# Patient Record
Sex: FEMALE | Race: OTHER | Employment: FULL TIME | URBAN - METROPOLITAN AREA
[De-identification: names, ages, dates, MRNs, and addresses within clinical notes are randomized per-mention and may not be internally consistent; named-entity substitution may affect disease eponyms.]

---

## 2024-05-10 ENCOUNTER — OFFICE VISIT (OUTPATIENT)
Dept: OBGYN CLINIC | Facility: CLINIC | Age: 39
End: 2024-05-10
Payer: COMMERCIAL

## 2024-05-10 VITALS
HEIGHT: 67 IN | SYSTOLIC BLOOD PRESSURE: 148 MMHG | DIASTOLIC BLOOD PRESSURE: 102 MMHG | BODY MASS INDEX: 30.29 KG/M2 | WEIGHT: 193 LBS

## 2024-05-10 DIAGNOSIS — Z01.419 NORMAL GYNECOLOGIC EXAMINATION: Primary | ICD-10-CM

## 2024-05-10 DIAGNOSIS — Z12.31 BREAST CANCER SCREENING BY MAMMOGRAM: ICD-10-CM

## 2024-05-10 PROCEDURE — 3080F DIAST BP >= 90 MM HG: CPT | Performed by: OBSTETRICS & GYNECOLOGY

## 2024-05-10 PROCEDURE — 99385 PREV VISIT NEW AGE 18-39: CPT | Performed by: OBSTETRICS & GYNECOLOGY

## 2024-05-10 PROCEDURE — 3077F SYST BP >= 140 MM HG: CPT | Performed by: OBSTETRICS & GYNECOLOGY

## 2024-05-10 PROCEDURE — 3008F BODY MASS INDEX DOCD: CPT | Performed by: OBSTETRICS & GYNECOLOGY

## 2024-05-10 RX ORDER — DULOXETIN HYDROCHLORIDE 60 MG/1
CAPSULE, DELAYED RELEASE ORAL
COMMUNITY
Start: 2023-04-14

## 2024-05-10 RX ORDER — AMOXICILLIN 250 MG
1 CAPSULE ORAL
COMMUNITY

## 2024-05-10 RX ORDER — DEXTROAMPHETAMINE SACCHARATE, AMPHETAMINE ASPARTATE MONOHYDRATE, DEXTROAMPHETAMINE SULFATE AND AMPHETAMINE SULFATE 5; 5; 5; 5 MG/1; MG/1; MG/1; MG/1
20 CAPSULE, EXTENDED RELEASE ORAL DAILY
COMMUNITY
Start: 2024-03-22

## 2024-05-10 RX ORDER — DIAZEPAM 10 MG/1
10 TABLET ORAL NIGHTLY PRN
COMMUNITY
Start: 2024-04-15

## 2024-05-10 RX ORDER — DEXTROAMPHETAMINE SACCHARATE, AMPHETAMINE ASPARTATE MONOHYDRATE, DEXTROAMPHETAMINE SULFATE AND AMPHETAMINE SULFATE 5; 5; 5; 5 MG/1; MG/1; MG/1; MG/1
CAPSULE, EXTENDED RELEASE ORAL
COMMUNITY
Start: 2024-04-23

## 2024-05-10 RX ORDER — DEXTROAMPHETAMINE SACCHARATE, AMPHETAMINE ASPARTATE, DEXTROAMPHETAMINE SULFATE AND AMPHETAMINE SULFATE 5; 5; 5; 5 MG/1; MG/1; MG/1; MG/1
TABLET ORAL
COMMUNITY
Start: 2023-05-01

## 2024-05-10 RX ORDER — CYCLOBENZAPRINE HCL 5 MG
5 TABLET ORAL 3 TIMES DAILY PRN
COMMUNITY
Start: 2024-02-16

## 2024-05-10 RX ORDER — LOSARTAN POTASSIUM AND HYDROCHLOROTHIAZIDE 12.5; 5 MG/1; MG/1
1 TABLET ORAL DAILY
COMMUNITY
Start: 2023-04-21

## 2024-05-10 RX ORDER — MELOXICAM 15 MG/1
TABLET ORAL
COMMUNITY
Start: 2024-04-15

## 2024-05-10 RX ORDER — MELOXICAM 7.5 MG/1
7.5 TABLET ORAL DAILY
COMMUNITY

## 2024-05-10 RX ORDER — AZITHROMYCIN 250 MG/1
TABLET, FILM COATED ORAL
COMMUNITY
Start: 2023-12-28

## 2024-05-10 RX ORDER — DULOXETIN HYDROCHLORIDE 30 MG/1
1 CAPSULE, DELAYED RELEASE ORAL AS DIRECTED
COMMUNITY
Start: 2023-03-17

## 2024-05-10 RX ORDER — PREDNISONE 10 MG/1
TABLET ORAL
COMMUNITY
Start: 2023-12-28

## 2024-05-10 RX ORDER — PREGABALIN 150 MG/1
150 CAPSULE ORAL 2 TIMES DAILY
COMMUNITY
Start: 2024-02-20

## 2024-05-10 RX ORDER — QUETIAPINE FUMARATE 50 MG/1
TABLET, FILM COATED ORAL
COMMUNITY
Start: 2024-03-22

## 2024-05-10 RX ORDER — HYDROCODONE BITARTRATE AND ACETAMINOPHEN 5; 325 MG/1; MG/1
1 TABLET ORAL EVERY 6 HOURS PRN
COMMUNITY
Start: 2023-11-28

## 2024-05-10 RX ORDER — AMOXICILLIN AND CLAVULANATE POTASSIUM 875; 125 MG/1; MG/1
1 TABLET, FILM COATED ORAL 2 TIMES DAILY
COMMUNITY
Start: 2023-12-15

## 2024-05-10 RX ORDER — DEXTROAMPHETAMINE SACCHARATE, AMPHETAMINE ASPARTATE MONOHYDRATE, DEXTROAMPHETAMINE SULFATE AND AMPHETAMINE SULFATE 3.75; 3.75; 3.75; 3.75 MG/1; MG/1; MG/1; MG/1
15 CAPSULE, EXTENDED RELEASE ORAL EVERY MORNING
COMMUNITY
Start: 2024-01-31

## 2024-05-10 RX ORDER — OMEPRAZOLE 40 MG/1
40 CAPSULE, DELAYED RELEASE ORAL DAILY
COMMUNITY

## 2024-05-10 RX ORDER — BUTALBITAL, ACETAMINOPHEN AND CAFFEINE 50; 325; 40 MG/1; MG/1; MG/1
1 TABLET ORAL EVERY 4 HOURS PRN
COMMUNITY
Start: 2023-03-15

## 2024-05-10 NOTE — PROGRESS NOTES
Lexy Amos is a 38 year old female  Patient's last menstrual period was 2018.   Chief Complaint   Patient presents with    Annual     Pt had biopsy for cervical fluid buildup had grainage.   Hysterectomy 2018, supracervical.     OBSTETRICS HISTORY:     OB History    Para Term  AB Living   3 2 2   1 3   SAB IAB Ectopic Multiple Live Births   1     1 3      # Outcome Date GA Lbr Alexey/2nd Weight Sex Type Anes PTL Lv   3A Term 07/02/15   7 lb 4 oz (3.289 kg) F CS-Unspec   SCARLET   3B Term 07/02/15   8 lb 2 oz (3.685 kg) M CS-Unspec   SCARLET   2 SAB 2014           1 Term 09   9 lb 2 oz (4.139 kg) F CS-Unspec   SCARLET       GYNE HISTORY:     Hx Prior Abnormal Pap: No  Pap Date: 23  Pap Result Notes: per pt normal. cervical fluid bluidup   Period Cycle (Days): partial hysterectomy (5/10/2024  9:36 AM)  Use of Birth Control (if yes, specify type): Hysterectomy (5/10/2024  9:36 AM)  Hx Prior Abnormal Pap: No (5/10/2024  9:36 AM)  Pap Date: 23 (5/10/2024  9:36 AM)  Pap Result Notes: per pt normal. cervical fluid bluidup (5/10/2024  9:36 AM)         No data to display                  MEDICAL HISTORY:     Past Medical History:    Fibromyalgia    OTHER DISEASES    PIH/ Preeclampsia       SURGICAL HISTORY:     Past Surgical History:   Procedure Laterality Date          X 1     delivery only      Endometrial ablation      Hc supracervical abdominal hysterectomy         SOCIAL HISTORY:     Social History     Socioeconomic History    Marital status:    Tobacco Use    Smoking status: Former     Types: Cigarettes    Smokeless tobacco: Never    Tobacco comments:     1 pack per week   Substance and Sexual Activity    Alcohol use: No    Drug use: No        FAMILY HISTORY:     History reviewed. No pertinent family history.    MEDICATIONS:       Current Outpatient Medications:     amoxicillin clavulanate 875-125 MG Oral Tab, Take 1 tablet by mouth 2 (two) times daily., Disp: ,  Rfl:     amphetamine-dextroamphetamine (ADDERALL) 20 MG Oral Tab, , Disp: , Rfl:     Amphetamine-Dextroamphet ER 15 MG Oral Capsule SR 24 Hr, Take 1 capsule (15 mg total) by mouth every morning., Disp: , Rfl:     Amphetamine-Dextroamphet ER 20 MG Oral Capsule SR 24 Hr, Take 1 capsule (20 mg total) by mouth daily., Disp: , Rfl:     Amphetamine-Dextroamphet ER 20 MG Oral Capsule SR 24 Hr, , Disp: , Rfl:     azithromycin 250 MG Oral Tab, TAKE 2 TABLETS BY MOUTH TODAY, THEN TAKE 1 TABLET DAILY FOR 4 DAYS AS DIRECTED, Disp: , Rfl:     butalbital-acetaminophen-caffeine -40 MG Oral Tab, Take 1 tablet by mouth every 4 (four) hours as needed., Disp: , Rfl:     cyclobenzaprine 5 MG Oral Tab, Take 1 tablet (5 mg total) by mouth 3 (three) times daily as needed for Muscle spasms., Disp: , Rfl:     diazePAM 10 MG Oral Tab, Take 1 tablet (10 mg total) by mouth nightly as needed for Sleep. AT BEDTIME, Disp: , Rfl:     Diclofenac Sodium  MG Oral Tablet 24 Hr, , Disp: , Rfl:     DULoxetine 60 MG Oral Cap DR Particles, , Disp: , Rfl:     HYDROcodone-acetaminophen 5-325 MG Oral Tab, Take 1 tablet by mouth every 6 (six) hours as needed for Pain., Disp: , Rfl:     losartan-hydroCHLOROthiazide 50-12.5 MG Oral Tab, Take 1 tablet by mouth daily., Disp: , Rfl:     DULoxetine 30 MG Oral Cap DR Particles, Take 1 capsule (30 mg total) by mouth As Directed., Disp: , Rfl:     Meloxicam 15 MG Oral Tab, TAKE ONE TABLET BY MOUTH ONCE DAILY FOR FIBROMYALGIA, Disp: , Rfl:     Meloxicam 7.5 MG Oral Tab, Take 1 tablet (7.5 mg total) by mouth daily., Disp: , Rfl:     Omeprazole 40 MG Oral Capsule Delayed Release, Take 1 capsule (40 mg total) by mouth daily., Disp: , Rfl:     predniSONE 10 MG Oral Tab, every 28 days. FOR 21 DAYS IN, THEN REMOVE FOR 7 DAYS, Disp: , Rfl:     pregabalin 150 MG Oral Cap, Take 1 capsule (150 mg total) by mouth 2 (two) times daily., Disp: , Rfl:     SEROQUEL 50 MG Oral Tab, , Disp: , Rfl:     sennosides-docusate  8.6-50 MG Oral Tab, Take 1 tablet by mouth twice a week., Disp: , Rfl:     Butalbital-Aspirin-Caffeine (BUTALBITAL COMPOUND/ASA) -40 MG Oral Cap, 1 po q 6 hr prn, max 2 per day and 5 week, Disp: 20 Cap, Rfl: 2    ALLERGIES:       Allergies   Allergen Reactions    Cortisone OTHER (SEE COMMENTS) and PAIN    Amitriptyline FATIGUE    Latex ITCHING    Sulfa Drugs Cross Reactors RASH     Burning sensation on skin         REVIEW OF SYSTEMS:     Constitutional:    denies fever / chills  Eyes:     denies blurred or double vision  Cardiovascular:  denies chest pain or palpitations  Respiratory:    denies shortness of breath  Gastrointestinal:  denies severe abdominal pain, frequent diarrhea or constipation, nausea / vomiting  Genitourinary:    denies dysuria, bothersome incontinence  Skin/Breast:   denies any breast pain, lumps, or discharge  Neurological:    denies frequent severe headaches  Psychiatric:   denies depression or anxiety, thoughts of harming self or others  Heme/Lymph:    denies easy bruising or bleeding      PHYSICAL EXAM:   Blood pressure (!) 148/102, height 5' 7\" (1.702 m), weight 193 lb (87.5 kg), last menstrual period 12/12/2018, not currently breastfeeding.  Constitutional:  well developed, well nourished  Head/Face:  normocephalic  Neck/Thyroid: thyroid symmetric, no thyromegaly, no nodules, no adenopathy  Lymphatic: no abnormal supraclavicular or axillary adenopathy is noted  Respiratory:      chest wall symmetric and nontender on palpation, clear to asculation bilateral, no wheezing, rales, ronchi, and resonance normal upon percussion  Cardiovascular: chest normal in appearance, regular rate and rhythm, no murmurs, PMI palpated midclavicular line  Breast:   normal without palpable masses, tenderness, asymmetry, nipple discharge, nipple retraction or skin changes  Abdomen:   soft, nontender, nondistended, no masses  Skin/Hair:  no unusual rashes or bruises  Extremities:  no edema, no cyanosis, non  tender bilaterally  Psychiatric:   oriented to time, place, person and situation. Appropriate mood and affect    Pelvic Exam:  External Genitalia:  normal appearance, hair distribution, and no lesions  Urethral Meatus:   normal in size, location, without lesions   Bladder:    no fullness, masses or tenderness  Vagina:    normal appearance without lesions, no abnormal discharge  Cervix:    No lesions, normal friability   Adnexa:   normal without masses or tenderness  Perineum:   normal  Anus: no hemorroids         ASSESSMENT & PLAN:     Lexy was seen today for annual.    Diagnoses and all orders for this visit:    Normal gynecologic examination  -     ThinPrep Pap with HPV Reflex, Chlamydia/GC; Future  -     Chlamydia/Gc Amplification; Future  Nutrition, weight screening and exercise were discussed with the patient.  Exercise should encompass approximately 150 minutes/week.  Self breast exam counseling was also given.  I advised the patient to avoid tobacco, drugs and alcohol.  Influenza vaccine was offered if seasonally appropriate.  HPV and STD prevention counseling was given.  Health maintenance checklist  was reviewed including Pap smear, cervical cultures, and mammogram screening if age-appropriate.  Appropriate follow-up scheduling was discussed with the patient.     Breast cancer screening by mammogram  -     [facility] BERTIN 2D+3D SCREENING BILAT (CPT=77067/20876); Future    Hx endometriosis- c/o painful coitus.       FOLLOW-UP     Return in about 1 year (around 5/10/2025) for Annual exam.      Carlton Ludwig MD  5/10/2024

## 2024-05-13 LAB
C TRACH DNA SPEC QL NAA+PROBE: NEGATIVE
N GONORRHOEA DNA SPEC QL NAA+PROBE: NEGATIVE

## 2024-06-21 NOTE — PROGRESS NOTES
Lexy Amos is a 39 year old female  Patient's last menstrual period was 2018.   Chief Complaint   Patient presents with    Gyn Problem     Left breast pain    Left armpit, and luouter quad. No c/o right breast.     OBSTETRICS HISTORY:     OB History    Para Term  AB Living   3 2 2   1 3   SAB IAB Ectopic Multiple Live Births   1     1 3      # Outcome Date GA Lbr Alexey/2nd Weight Sex Type Anes PTL Lv   3A Term 07/02/15   7 lb 4 oz (3.289 kg) F CS-Unspec   SCARLET   3B Term 07/02/15   8 lb 2 oz (3.685 kg) M CS-Unspec   SCARLET   2 SAB 2014           1 Term 09   9 lb 2 oz (4.139 kg) F CS-Unspec   SCARLET       GYNE HISTORY:         Period Cycle (Days): partial hysterectomy (5/10/2024  9:36 AM)  Use of Birth Control (if yes, specify type): Hysterectomy (5/10/2024  9:36 AM)  Hx Prior Abnormal Pap: No (5/10/2024  9:36 AM)  Pap Date: 23 (5/10/2024  9:36 AM)  Pap Result Notes: per pt normal. cervical fluid bluidup (5/10/2024  9:36 AM)         No data to display                  MEDICAL HISTORY:     Past Medical History:    Fibromyalgia    OTHER DISEASES    PIH/ Preeclampsia       SURGICAL HISTORY:     Past Surgical History:   Procedure Laterality Date          X 1     delivery only      Endometrial ablation      Hc supracervical abdominal hysterectomy      Hysterectomy  2018       SOCIAL HISTORY:     Social History     Socioeconomic History    Marital status:    Tobacco Use    Smoking status: Former     Types: Cigarettes    Smokeless tobacco: Never    Tobacco comments:     1 pack per week   Substance and Sexual Activity    Alcohol use: No    Drug use: No        FAMILY HISTORY:     History reviewed. No pertinent family history.    MEDICATIONS:       Current Outpatient Medications:     Amphetamine-Dextroamphet ER 20 MG Oral Capsule SR 24 Hr, Take 1 capsule (20 mg total) by mouth daily., Disp: , Rfl:     cyclobenzaprine 5 MG Oral Tab, Take 1 tablet (5 mg total) by mouth 3  (three) times daily as needed for Muscle spasms., Disp: , Rfl:     diazePAM 10 MG Oral Tab, Take 1 tablet (10 mg total) by mouth nightly as needed for Sleep. AT BEDTIME, Disp: , Rfl:     Diclofenac Sodium  MG Oral Tablet 24 Hr, , Disp: , Rfl:     DULoxetine 60 MG Oral Cap DR Particles, , Disp: , Rfl:     HYDROcodone-acetaminophen 5-325 MG Oral Tab, Take 1 tablet by mouth every 6 (six) hours as needed for Pain., Disp: , Rfl:     losartan-hydroCHLOROthiazide 50-12.5 MG Oral Tab, Take 1 tablet by mouth daily., Disp: , Rfl:     DULoxetine 30 MG Oral Cap DR Particles, Take 1 capsule (30 mg total) by mouth As Directed., Disp: , Rfl:     Omeprazole 40 MG Oral Capsule Delayed Release, Take 1 capsule (40 mg total) by mouth daily., Disp: , Rfl:     pregabalin 150 MG Oral Cap, Take 1 capsule (150 mg total) by mouth 2 (two) times daily., Disp: , Rfl:     SEROQUEL 50 MG Oral Tab, , Disp: , Rfl:     sennosides-docusate 8.6-50 MG Oral Tab, Take 1 tablet by mouth twice a week., Disp: , Rfl:     Butalbital-Aspirin-Caffeine (BUTALBITAL COMPOUND/ASA) -40 MG Oral Cap, 1 po q 6 hr prn, max 2 per day and 5 week, Disp: 20 Cap, Rfl: 2    amphetamine-dextroamphetamine (ADDERALL) 20 MG Oral Tab, , Disp: , Rfl:     Amphetamine-Dextroamphet ER 15 MG Oral Capsule SR 24 Hr, Take 1 capsule (15 mg total) by mouth every morning. (Patient not taking: Reported on 6/21/2024), Disp: , Rfl:     Amphetamine-Dextroamphet ER 20 MG Oral Capsule SR 24 Hr, , Disp: , Rfl:     butalbital-acetaminophen-caffeine -40 MG Oral Tab, Take 1 tablet by mouth every 4 (four) hours as needed. (Patient not taking: Reported on 6/21/2024), Disp: , Rfl:     Meloxicam 15 MG Oral Tab, TAKE ONE TABLET BY MOUTH ONCE DAILY FOR FIBROMYALGIA (Patient not taking: Reported on 6/21/2024), Disp: , Rfl:     Meloxicam 7.5 MG Oral Tab, Take 1 tablet (7.5 mg total) by mouth daily., Disp: , Rfl:     predniSONE 10 MG Oral Tab, every 28 days. FOR 21 DAYS IN, THEN REMOVE FOR 7  DAYS (Patient not taking: Reported on 6/21/2024), Disp: , Rfl:     ALLERGIES:       Allergies   Allergen Reactions    Cortisone OTHER (SEE COMMENTS) and PAIN    Amitriptyline FATIGUE    Latex ITCHING    Sulfa Drugs Cross Reactors RASH     Burning sensation on skin         REVIEW OF SYSTEMS:     Constitutional:    denies fever / chills  Eyes:     denies blurred or double vision  Cardiovascular:  denies chest pain or palpitations  Respiratory:    denies shortness of breath  Gastrointestinal:  denies severe abdominal pain, frequent diarrhea or constipation, nausea / vomiting  Genitourinary:    denies dysuria, bothersome incontinence  Skin/Breast:   denies any breast pain, lumps, or discharge  Neurological:    denies frequent severe headaches  Psychiatric:   denies depression or anxiety, thoughts of harming self or others  Heme/Lymph:    denies easy bruising or bleeding      PHYSICAL EXAM:   Blood pressure 124/84, pulse 87, height 5' 7\" (1.702 m), weight 196 lb (88.9 kg), last menstrual period 12/12/2018, not currently breastfeeding.  Constitutional:  well developed, well nourished  Head/Face:  normocephalic  Neck/Thyroid: thyroid symmetric, no thyromegaly, no nodules, no adenopathy  Lymphatic: no abnormal supraclavicular or axillary adenopathy is noted  Respiratory:      chest wall symmetric and nontender on palpation, clear to asculation bilateral, no wheezing, rales, ronchi, and resonance normal upon percussion  Cardiovascular: chest normal in appearance, regular rate and rhythm, no murmurs, PMI palpated midclavicular line  Breast:   normal without palpable masses, tenderness, asymmetry, nipple discharge, nipple retraction or skin changes bilaterally  Abdomen:   soft, nontender, nondistended, no masses  Skin/Hair:  no unusual rashes or bruises  Extremities:  no edema, no cyanosis, non tender bilaterally  Psychiatric:   oriented to time, place, person and situation. Appropriate mood and affect          ASSESSMENT &  PLAN:     Lexy was seen today for gyn problem.    Diagnoses and all orders for this visit:    Breast pain, left  -     SURGERY - INTERNAL  Exam normal today. Imaging reviewed. F/u noted 6 mos.   Pt does not consume caffeine  She has fibromyalgia  Plan: refer to general surgery to evaluate films        FOLLOW-UP     No follow-ups on file.      Carlton Ludwig MD  6/21/2024

## 2024-11-04 NOTE — PROGRESS NOTES
Lexy Amos is a 39 year old female  Patient's last menstrual period was 10/30/2024.   Chief Complaint   Patient presents with    Gyn Exam     Pt c/o abdominal pain and cramping post hysterectomy. Horrible cramps since last week no bleeding   Surgery 2023 Dr. Escobar. Pt c/o last week getting out of vehicle she had a pain from \"cervix to my back\" lasting 45 secs. Ever since then cramping.     OBSTETRICS HISTORY:     OB History    Para Term  AB Living   4 2 2   2 3   SAB IAB Ectopic Multiple Live Births   2     1 3      # Outcome Date GA Lbr Alexey/2nd Weight Sex Type Anes PTL Lv   4A Term 07/02/15   7 lb 4 oz (3.289 kg) F CS-Unspec   SCARLET   4B Term 07/02/15   8 lb 2 oz (3.685 kg) M CS-Unspec   SCARLET   3 SAB 2014           2 Term 09   9 lb 2 oz (4.139 kg) F CS-Unspec   SCARLET   1 SAB                GYNE HISTORY:         Period Cycle (Days): partial hysterectomy (5/10/2024  9:36 AM)  Use of Birth Control (if yes, specify type): Hysterectomy (5/10/2024  9:36 AM)  Hx Prior Abnormal Pap: No (5/10/2024  9:36 AM)  Pap Date: 23 (5/10/2024  9:36 AM)  Pap Result Notes: per pt normal. cervical fluid bluidup (5/10/2024  9:36 AM)         No data to display                  MEDICAL HISTORY:     Past Medical History:    Fibromyalgia    OTHER DISEASES    PIH/ Preeclampsia       SURGICAL HISTORY:     Past Surgical History:   Procedure Laterality Date          X 1     delivery only      Endometrial ablation      Hc supracervical abdominal hysterectomy      Hysterectomy  2018       SOCIAL HISTORY:     Social History     Socioeconomic History    Marital status:    Tobacco Use    Smoking status: Former     Types: Cigarettes     Passive exposure: Past    Smokeless tobacco: Never    Tobacco comments:     1 pack per week   Substance and Sexual Activity    Alcohol use: No    Drug use: No        FAMILY HISTORY:     History reviewed. No pertinent family history.    MEDICATIONS:       Current  Outpatient Medications:     zolpidem 10 MG Oral Tab, Take 1 tablet (10 mg total) by mouth nightly as needed., Disp: , Rfl:     teraconazole 0.4 % Vaginal Cream, Place 1 applicator vaginally nightly for 7 days., Disp: 7 each, Rfl: 0    amphetamine-dextroamphetamine (ADDERALL) 20 MG Oral Tab, , Disp: , Rfl:     Amphetamine-Dextroamphet ER 20 MG Oral Capsule SR 24 Hr, Take 1 capsule (20 mg total) by mouth daily., Disp: , Rfl:     butalbital-acetaminophen-caffeine -40 MG Oral Tab, Take 1 tablet by mouth every 4 (four) hours as needed., Disp: , Rfl:     cyclobenzaprine 5 MG Oral Tab, Take 1 tablet (5 mg total) by mouth 3 (three) times daily as needed for Muscle spasms., Disp: , Rfl:     diazePAM 10 MG Oral Tab, Take 1 tablet (10 mg total) by mouth nightly as needed for Sleep. AT BEDTIME, Disp: , Rfl:     DULoxetine 60 MG Oral Cap DR Particles, , Disp: , Rfl:     HYDROcodone-acetaminophen 5-325 MG Oral Tab, Take 1 tablet by mouth every 6 (six) hours as needed for Pain., Disp: , Rfl:     losartan-hydroCHLOROthiazide 50-12.5 MG Oral Tab, Take 1 tablet by mouth daily., Disp: , Rfl:     DULoxetine 30 MG Oral Cap DR Particles, Take 1 capsule (30 mg total) by mouth As Directed., Disp: , Rfl:     Meloxicam 15 MG Oral Tab, , Disp: , Rfl:     Omeprazole 40 MG Oral Capsule Delayed Release, Take 1 capsule (40 mg total) by mouth daily., Disp: , Rfl:     pregabalin 150 MG Oral Cap, Take 1 capsule (150 mg total) by mouth 2 (two) times daily., Disp: , Rfl:     SEROQUEL 50 MG Oral Tab, , Disp: , Rfl:     sennosides-docusate 8.6-50 MG Oral Tab, Take 1 tablet by mouth twice a week., Disp: , Rfl:     Diclofenac Sodium  MG Oral Tablet 24 Hr, , Disp: , Rfl:     ALLERGIES:     Allergies[1]      REVIEW OF SYSTEMS:     Constitutional:    denies fever / chills  Eyes:     denies blurred or double vision  Cardiovascular:  denies chest pain or palpitations  Respiratory:    denies shortness of breath  Gastrointestinal:  denies severe  abdominal pain, frequent diarrhea or constipation, nausea / vomiting  Genitourinary:    denies dysuria, bothersome incontinence  Skin/Breast:   denies any breast pain, lumps, or discharge  Neurological:    denies frequent severe headaches  Psychiatric:   denies depression or anxiety, thoughts of harming self or others  Heme/Lymph:    denies easy bruising or bleeding      PHYSICAL EXAM:   Blood pressure (!) 156/104, pulse 89, height 5' 7\" (1.702 m), weight 207 lb (93.9 kg), last menstrual period 10/30/2024, not currently breastfeeding.  Constitutional:  well developed, well nourished  Head/Face:  normocephalic  Neck/Thyroid: thyroid symmetric, no thyromegaly, no nodules, no adenopathy  Lymphatic: no abnormal supraclavicular or axillary adenopathy is noted  Respiratory:      chest wall symmetric and nontender on palpation, clear to asculation bilateral, no wheezing, rales, ronchi, and resonance normal upon percussion  Cardiovascular: chest normal in appearance, regular rate and rhythm, no murmurs, PMI palpated midclavicular line  Abdomen:   soft, nontender, nondistended, no masses  Skin/Hair:  no unusual rashes or bruises  Extremities:  no edema, no cyanosis, non tender bilaterally  Psychiatric:   oriented to time, place, person and situation. Appropriate mood and affect    Pelvic Exam:  External Genitalia:  normal appearance, hair distribution, and no lesions  Urethral Meatus:   normal in size, location, without lesions   Bladder:    no fullness, masses or tenderness  Vagina:    normal appearance without lesions, moderate thick white adherent discharge  Cervix:    No lesions, normal friability ,  nontender with qtip  Sve: no pelvic mass/tenderness      ASSESSMENT & PLAN:     Lexy was seen today for gyn exam.    Diagnoses and all orders for this visit:    Pelvic pain  -     US PELVIS (TRANSABDOMINAL AND TRANSVAGINAL) (CPT=76856/54933); Future  The cramping is most likely not GYN related.  I recommend an ultrasound  of the pelvis which I have ordered.  If it does not show fluid retention of the cervix then I recommend the patient follow-up with her primary care doctor to rule out GI or musculoskeletal origin.  The patient was told that if she has persistent pelvic pain that she can follow-up with Dr. Garcia or other gynecology oncology at [place] to see if she is a candidate for a laparoscopic bilateral oophorectomy as the patient states she has a history of endometriosis and previous surgery and it is possible that endometriosis could be because of persistent future pelvic pain.  Discussed with the patient in detail.  Vaginal candidiasis  -     teraconazole 0.4 % Vaginal Cream; Place 1 applicator vaginally nightly for 7 days.  Terazol 7 E scribed         FOLLOW-UP     No follow-ups on file.      Carlton Ludwig MD  11/4/2024         [1]   Allergies  Allergen Reactions    Cortisone OTHER (SEE COMMENTS) and PAIN    Amitriptyline FATIGUE    Latex ITCHING    Sulfa Drugs Cross Reactors RASH     Burning sensation on skin

## 2024-12-04 NOTE — TELEPHONE ENCOUNTER
Patient states a cervical ultrasound was ordered by Dr. Ludwig, states she does not want to have it done and will like order canceled . Please advise

## 2024-12-04 NOTE — TELEPHONE ENCOUNTER
Pt verified name and .    Pt requesting that order for pelvic ultrasound be cancelled as she would not like to have pelvic ultrasound completed. Order cancelled per pt request.

## 2025-05-23 NOTE — PROGRESS NOTES
Lexy Amos is a 40 year old female  Patient's last menstrual period was 10/30/2024.   Chief Complaint   Patient presents with    Annual     Pt here for annual exam no complaints     Pt had supracervical hysterectomy.   OBSTETRICS HISTORY:     OB History    Para Term  AB Living   4 2 2  2 3   SAB IAB Ectopic Multiple Live Births   2   1 3      # Outcome Date GA Lbr Alexey/2nd Weight Sex Type Anes PTL Lv   4A Term 07/02/15   7 lb 4 oz (3.289 kg) F CS-Unspec   SCARLET   4B Term 07/02/15   8 lb 2 oz (3.685 kg) M CS-Unspec   SCARLET   3 SAB 2014           2 Term 09   9 lb 2 oz (4.139 kg) F CS-Unspec   SCARLET   1 SAB                GYNE HISTORY:     Hx Prior Abnormal Pap: No  Pap Date: 05/10/24  Pap Result Notes: wnl   Use of Birth Control (if yes, specify type): Hysterectomy (2025 11:42 AM)  Hx Prior Abnormal Pap: No (2025 11:43 AM)  Pap Date: 05/10/24 (2025 11:43 AM)  Pap Result Notes: wnl (2025 11:43 AM)         No data to display                  MEDICAL HISTORY:     Past Medical History[1]    SURGICAL HISTORY:     Past Surgical History[2]    SOCIAL HISTORY:     Short Social Hx on File[3]     FAMILY HISTORY:     Family History[4]    MEDICATIONS:     Medications - Current[5]    ALLERGIES:     Allergies[6]      REVIEW OF SYSTEMS:     Constitutional:    denies fever / chills  Eyes:     denies blurred or double vision  Cardiovascular:  denies chest pain or palpitations  Respiratory:    denies shortness of breath  Gastrointestinal:  denies severe abdominal pain, frequent diarrhea or constipation, nausea / vomiting  Genitourinary:    denies dysuria, bothersome incontinence  Skin/Breast:   denies any breast pain, lumps, or discharge  Neurological:    denies frequent severe headaches  Psychiatric:   denies depression or anxiety, thoughts of harming self or others  Heme/Lymph:    denies easy bruising or bleeding      PHYSICAL EXAM:   Blood pressure 123/86, height 5' 7\" (1.702 m), weight 227  lb (103 kg), last menstrual period 10/30/2024, not currently breastfeeding.  Constitutional:  well developed, well nourished  Head/Face:  normocephalic  Neck/Thyroid: thyroid symmetric, no thyromegaly, no nodules, no adenopathy  Lymphatic: no abnormal supraclavicular or axillary adenopathy is noted  Respiratory:      chest wall symmetric and nontender on palpation, clear to asculation bilateral, no wheezing, rales, ronchi, and resonance normal upon percussion  Cardiovascular: chest normal in appearance, regular rate and rhythm, no murmurs, PMI palpated midclavicular line  Breast:   normal bilaterally without palpable masses, tenderness, asymmetry, nipple discharge, nipple retraction or skin changes bilaterally  Abdomen:   soft, nontender, nondistended, no masses  Skin/Hair:  no unusual rashes or bruises  Extremities:  no edema, no cyanosis, non tender bilaterally  Psychiatric:   oriented to time, place, person and situation. Appropriate mood and affect    Pelvic Exam:  External Genitalia:  normal appearance, hair distribution, and no lesions  Urethral Meatus:   normal in size, location, without lesions   Bladder:    no fullness, masses or tenderness  Vagina:    normal appearance without lesions, no abnormal discharge  Cervix:    No lesions, normal friability   Adnexa:   normal without masses or tenderness  Perineum:   normal  Anus: no hemorroids         ASSESSMENT & PLAN:     Leyx was seen today for annual.    Diagnoses and all orders for this visit:    Normal gynecologic examination  -     ThinPrep Pap with HPV Reflex, Chlamydia/GC; Future  -     Chlamydia/Gc Amplification; Future  Nutrition, weight screening and exercise were discussed with the patient.  Exercise should encompass approximately 150 minutes/week.  Self breast exam counseling was also given.  I advised the patient to avoid tobacco, drugs and alcohol.  Influenza vaccine was offered if seasonally appropriate.  HPV and STD prevention counseling was  given.  Health maintenance checklist  was reviewed including Pap smear, cervical cultures, and mammogram screening if age-appropriate.  Appropriate follow-up scheduling was discussed with the patient.    Pt has f/u us breast scheduled  Encounter for tobacco use cessation counseling  -     Smoking Cessation less than 3 minutes  TOBACCO CESSATIONS    Risks of tobacco  Knowing the serious health risks of using tobacco may help motivate you to quit. Using tobacco over a long time can increase your risk for many health problems.    Information  Tobacco is a plant. Its leaves are smoked, chewed, or sniffed for a variety of effects.  Tobacco contains the chemical nicotine, which is an addictive substance.  Tobacco smoke contains more than 7,000 chemicals, at least 70 of which are known to cause cancer.  Tobacco that is not burned is called smokeless tobacco. Including nicotine, there are at least 30 chemicals in smokeless tobacco that are known to cause cancer.    HEALTH RISKS OF SMOKING OR USING SMOKELESS TOBACCO  There are many health risks from smoking and using tobacco. The more serious ones are listed below.    Heart and blood vessel problems:  Blood clots and weakness in the walls of blood vessels in the brain, which can lead to stroke  Blood clots in the legs, which may travel to the lungs  Coronary artery disease, including angina and heart attack  Temporarily increased blood pressure after smoking  Poor blood supply to the legs  Problems with erections because of decreased blood flow into the penis    Other health risks or problems:  Cancer (more likely in the lung, mouth, larynx, nose and sinuses, throat, esophagus, stomach, bladder, kidney, pancreas, cervix, colon, and rectum)  Poor wound healing after surgery  Lung problems, such as COPD, or asthma that is harder to control  Problems during pregnancy, such as babies born at a low birth weight, early labor, losingour baby , and cleft lip  Decreased ability to  taste and smell  Harm to sperm, which may lead to infertility  Loss of sight due to an increased risk of macular degeneration  Tooth and gum diseases  Wrinkling of the skin    Smokers who switch to smokeless tobacco instead of quitting tobacco still have health risks:  Increased risk for cancer of the mouth, tongue, esophagus, and pancreas  Gum problems, tooth wear, and cavities  Worsening high blood pressure and angina    HEALTH RISKS OF SECONDHAND SMOKE   Those who are often around the smoke of others (secondhand smoke) have a higher risk for:  Heart attack and heart disease   Lung cancer  Sudden and severe reactions, including of the eye, nose, throat, and lower respiratory tract    Infants and children who are often exposed to secondhand smoke are at risk for:  Asthma flares (children with asthma who live with a smoker are much more likely to visit the emergency room)  Infections of the mouth, throat, sinuses, ears, and lungs  Lung damage (poor lung function)  Sudden infant death syndrome (SIDS)    There are many reasons to quit using tobacco. Long-term use of tobacco can increase your risk of many serious health problems.    THE BENEFITS OF QUITTING    HEALTH BENEFITS   Some health benefits begin almost immediately. Every week, month, and year without tobacco further improves your health.  Within 20 minutes of quitting: Your blood pressure and heart rate drop to normal.  Within 12 hours of quitting: Your blood carbon monoxide level drops to normal.  Within 2 weeks to 3 months of quitting: Your circulation improves and your lung function increases.  Within 1 to 9 months of quitting: Coughing and shortness of breath decrease. Your lungs and airways are more able to handle mucus, clean the lungs, and reduce the risk of infection.  Within 1 year of quitting: Your risk of coronary heart disease is half that of someone still using tobacco. Your heart attack risk drops dramatically.  Within 5 years of quitting: Your  risk of mouth, throat, esophagus, and bladder cancers are reduced by half. Cervical cancer risk falls to that of a non-smoker. Your stroke risk can fall to that of a non-smoker after 2 to 5 years.   Within 10 years of quitting: Your risk of dying from lung cancer is about one half that of a person who still smokes.  Within 15 years of quitting: Your risk of coronary heart disease is that of a non-smoker's.    Other health benefits of quitting smoking include:  Lower chance of blood clots in the legs, which may travel to the lungs   Lower risk of erectile dysfunction  Fewer problems during pregnancy, such as babies born at low birth weight, premature labor, miscarriage, and cleft lip  Lower risk of infertility due to damaged sperm  Healthier teeth, gums, and skin    Infants and children who you live with will have:  Asthma that is easier to control  Fewer visits to the emergency room  Fewer colds, ear infections, and pneumonia  Reduced risk of sudden infant death syndrome (SIDS)    MAKING THE DECISION  If you smoke, you should quit. But quitting can be hard. Most people who have quit smoking have tried at least once, without success, in the past. View any past attempts to quit as a learning experience, not a failure. Like any addiction, quitting tobacco is difficult, especially if you are doing it alone.  Seek support from family members, friends, and coworkers.  Talk to your health care provider about nicotine replacement therapy and smoking  Join a smoking cessation program and you will have a much better chance of success.     Such programs are offered by hospitals, health departments, community centers, and work sites.            FOLLOW-UP     Return in about 1 year (around 2026) for Annual exam.      Carlton Ludwig MD  2025       [1]   Past Medical History:   Fibromyalgia    OTHER DISEASES    PIH/ Preeclampsia   [2]   Past Surgical History:  Procedure Laterality Date          X 1      delivery only      Endometrial ablation      Hc supracervical abdominal hysterectomy      Hysterectomy  2018   [3]   Social History  Socioeconomic History    Marital status:    Tobacco Use    Smoking status: Former     Types: Cigarettes     Passive exposure: Past    Smokeless tobacco: Never    Tobacco comments:     1 pack per week   Vaping Use    Vaping status: Some Days   Substance and Sexual Activity    Alcohol use: No    Drug use: No   [4] History reviewed. No pertinent family history.  [5]   Current Outpatient Medications:     buPROPion  MG Oral Tablet 12 Hr, Take 1 tablet (100 mg total) by mouth nightly., Disp: , Rfl:     polyethylene glycol, PEG 3350, 17 g Oral Powd Pack, Take 17 g by mouth daily., Disp: , Rfl:     QUEtiapine 50 MG Oral Tab, Take 1 tablet (50 mg total) by mouth nightly., Disp: , Rfl:     zolpidem 10 MG Oral Tab, Take 1 tablet (10 mg total) by mouth nightly as needed., Disp: , Rfl:     amphetamine-dextroamphetamine (ADDERALL) 20 MG Oral Tab, , Disp: , Rfl:     Amphetamine-Dextroamphet ER 20 MG Oral Capsule SR 24 Hr, Take 1 capsule (20 mg total) by mouth daily. (Patient taking differently: Take 10 mg by mouth in the morning.), Disp: , Rfl:     butalbital-acetaminophen-caffeine -40 MG Oral Tab, Take 1 tablet by mouth every 4 (four) hours as needed., Disp: , Rfl:     diazePAM 10 MG Oral Tab, Take 1 tablet (10 mg total) by mouth nightly as needed for Sleep. AT BEDTIME, Disp: , Rfl:     DULoxetine 60 MG Oral Cap DR Particles, , Disp: , Rfl:     losartan-hydroCHLOROthiazide 50-12.5 MG Oral Tab, Take 1 tablet by mouth daily., Disp: , Rfl:     Omeprazole 40 MG Oral Capsule Delayed Release, Take 1 capsule (40 mg total) by mouth daily., Disp: , Rfl:     pregabalin 150 MG Oral Cap, Take 1 capsule (150 mg total) by mouth 2 (two) times daily., Disp: , Rfl:   [6]   Allergies  Allergen Reactions    Cortisone OTHER (SEE COMMENTS) and PAIN    Amitriptyline FATIGUE    Latex ITCHING    Sulfa  Drugs Cross Reactors RASH     Burning sensation on skin

## 2025-06-23 NOTE — TELEPHONE ENCOUNTER
Patient called for mammogram order. Dr. Ludwig wanted to have done annually. Does have ultrasound order. Please call.